# Patient Record
Sex: FEMALE | Race: WHITE | NOT HISPANIC OR LATINO | RURAL
[De-identification: names, ages, dates, MRNs, and addresses within clinical notes are randomized per-mention and may not be internally consistent; named-entity substitution may affect disease eponyms.]

---

## 2024-08-27 ENCOUNTER — HOSPITAL ENCOUNTER (EMERGENCY)
Facility: HOSPITAL | Age: 29
Discharge: HOME | End: 2024-08-27
Payer: COMMERCIAL

## 2024-08-27 VITALS
OXYGEN SATURATION: 98 % | HEART RATE: 98 BPM | SYSTOLIC BLOOD PRESSURE: 129 MMHG | TEMPERATURE: 98 F | RESPIRATION RATE: 19 BRPM | BODY MASS INDEX: 32.95 KG/M2 | WEIGHT: 205 LBS | HEIGHT: 66 IN | DIASTOLIC BLOOD PRESSURE: 93 MMHG
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
## 2024-08-27 DIAGNOSIS — S80.02XA CONTUSION OF LEFT KNEE, INITIAL ENCOUNTER: ICD-10-CM

## 2024-08-27 DIAGNOSIS — S80.01XA CONTUSION OF RIGHT KNEE, INITIAL ENCOUNTER: Primary | ICD-10-CM

## 2024-08-27 DIAGNOSIS — S96.911A SPRAIN AND STRAIN OF RIGHT ANKLE: ICD-10-CM

## 2024-08-27 DIAGNOSIS — S93.401A SPRAIN AND STRAIN OF RIGHT ANKLE: ICD-10-CM

## 2024-08-27 LAB
AMPHET UR QL SCN: NEGATIVE
BARBITURATES UR QL SCN: NEGATIVE
BENZODIAZ METAB UR QL SCN: NEGATIVE
COCAINE UR QL SCN: NEGATIVE
MDA UR QL SCN: NEGATIVE
METHADONE UR QL SCN: NEGATIVE
METHAMPHET UR QL SCN: NEGATIVE
OPIATES UR QL SCN: NEGATIVE
OXYCODONE UR QL SCN: NEGATIVE
PCP UR QL SCN: NEGATIVE
THC UR QL SCN: NEGATIVE
TRICYCLICS UR QL SCN: NEGATIVE

## 2024-08-27 PROCEDURE — 96372 THER/PROPH/DIAG INJ SC/IM: CPT | Performed by: NURSE PRACTITIONER

## 2024-08-27 PROCEDURE — 99284 EMERGENCY DEPT VISIT MOD MDM: CPT | Mod: 25

## 2024-08-27 PROCEDURE — 99284 EMERGENCY DEPT VISIT MOD MDM: CPT | Mod: ,,, | Performed by: NURSE PRACTITIONER

## 2024-08-27 PROCEDURE — 63600175 PHARM REV CODE 636 W HCPCS: Performed by: NURSE PRACTITIONER

## 2024-08-27 PROCEDURE — 80305 DRUG TEST PRSMV DIR OPT OBS: CPT | Performed by: NURSE PRACTITIONER

## 2024-08-27 RX ORDER — KETOROLAC TROMETHAMINE 30 MG/ML
15 INJECTION, SOLUTION INTRAMUSCULAR; INTRAVENOUS
Status: COMPLETED | OUTPATIENT
Start: 2024-08-27 | End: 2024-08-27

## 2024-08-27 RX ORDER — SERTRALINE HYDROCHLORIDE 100 MG/1
TABLET, FILM COATED ORAL
COMMUNITY
Start: 2024-05-31

## 2024-08-27 RX ORDER — CARIPRAZINE 3 MG/1
1 CAPSULE, GELATIN COATED ORAL
COMMUNITY
Start: 2024-08-05

## 2024-08-27 RX ADMIN — KETOROLAC TROMETHAMINE 15 MG: 30 INJECTION, SOLUTION INTRAMUSCULAR at 04:08

## 2024-08-27 NOTE — ED PROVIDER NOTES
Encounter Date: 8/27/2024       History     Chief Complaint   Patient presents with    Fall     28 yr old WF with c/o pain to bilateral knee, right ankle and right foot s/p fall.  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    The history is provided by the patient.   Fall  The accident occurred just prior to arrival. The fall occurred while running. She landed on Grass. The pain is present in the right knee and left knee. She was Ambulatory at the scene. There was No entrapment after the fall. There was No drug use involved in the accident. There was No alcohol use involved in the accident. Pertinent negatives include no neck pain, no back pain, no paresthesias, no paralysis, no abdominal pain and no loss of consciousness. The symptoms are aggravated by activity. She has tried nothing for the symptoms.     Review of patient's allergies indicates:  No Known Allergies  History reviewed. No pertinent past medical history.  History reviewed. No pertinent surgical history.  No family history on file.  Social History     Tobacco Use    Smoking status: Every Day     Types: Cigarettes, Vaping with nicotine    Smokeless tobacco: Never   Substance Use Topics    Alcohol use: Yes     Comment: social    Drug use: Never     Review of Systems   Constitutional: Negative.    Respiratory: Negative.     Cardiovascular: Negative.    Gastrointestinal:  Negative for abdominal pain.   Genitourinary: Negative.    Musculoskeletal:  Negative for back pain and neck pain.   Neurological:  Negative for loss of consciousness and paresthesias.       Physical Exam     Initial Vitals [08/27/24 1534]   BP Pulse Resp Temp SpO2   (!) 129/93 98 19 98.2 °F (36.8 °C) 98 %      MAP       --         Physical Exam    Constitutional: She appears well-developed and well-nourished.   Neck:   Normal range of motion.  Cardiovascular:  Normal rate and regular rhythm.           Pulmonary/Chest: Breath  sounds normal.   Musculoskeletal:      Cervical back: Normal range of motion.     Neurological: She is alert and oriented to person, place, and time.   Skin: Skin is warm and dry.   Psychiatric: She has a normal mood and affect.         Medical Screening Exam   See Full Note    ED Course   Procedures  Labs Reviewed   DRUG SCREEN, URINE (BEAKER)          Imaging Results              X-Ray Ankle Complete Right (Final result)  Result time 08/27/24 15:49:15      Final result by Salbador Vásquez II, MD (08/27/24 15:49:15)                   Impression:      No evidence of abnormality demonstrated      Electronically signed by: Salbador Vásquez  Date:    08/27/2024  Time:    15:49               Narrative:    EXAMINATION:  XR ANKLE COMPLETE 3 VIEW RIGHT; XR FOOT COMPLETE 3 VIEW RIGHT    CLINICAL HISTORY:  Unspecified fall, initial encounter    COMPARISON:  None available    TECHNIQUE:  XR ANKLE 3 VIEW RIGHT; XR FOOT 3 VIEW RIGHT    FINDINGS:  No evidence of fracture seen.  The alignment of the joints appears normal.  No degenerative change is present.  No soft tissue abnormality is seen.                                       X-Ray Foot Complete Right (Final result)  Result time 08/27/24 15:49:15      Final result by Salbador Vásquez II, MD (08/27/24 15:49:15)                   Impression:      No evidence of abnormality demonstrated      Electronically signed by: Salbador Vásquez  Date:    08/27/2024  Time:    15:49               Narrative:    EXAMINATION:  XR ANKLE COMPLETE 3 VIEW RIGHT; XR FOOT COMPLETE 3 VIEW RIGHT    CLINICAL HISTORY:  Unspecified fall, initial encounter    COMPARISON:  None available    TECHNIQUE:  XR ANKLE 3 VIEW RIGHT; XR FOOT 3 VIEW RIGHT    FINDINGS:  No evidence of fracture seen.  The alignment of the joints appears normal.  No degenerative change is present.  No soft tissue abnormality is seen.                                       X-Ray Knee 1 or 2 View Bilateral (Final result)  Result time  08/27/24 15:49:59   Procedure changed from X-Ray Knee 3 View Left     Final result by Salbador Vásquez II, MD (08/27/24 15:49:59)                   Impression:      No evidence of abnormality demonstrated      Electronically signed by: Salbador Vásquez  Date:    08/27/2024  Time:    15:49               Narrative:    EXAMINATION:  XR KNEE 1 OR 2 VIEW BILATERAL    CLINICAL HISTORY:  fall; Unspecified fall, initial encounter    COMPARISON:  4 August 2020    TECHNIQUE:  XR KNEE 2 VIEW BILATERAL    FINDINGS:  No evidence of fracture seen.  The alignment of the joints appears normal.  No degenerative change is present.  No soft tissue abnormality is seen.                                       Medications   ketorolac injection 15 mg (15 mg Intramuscular Given 8/27/24 1618)     Medical Decision Making  28 yr old WF with c/o pain to bilateral knee, right ankle and right foot s/p fall.  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      Amount and/or Complexity of Data Reviewed  Radiology: ordered.     Details: No acute findings      Risk  Prescription drug management.               ED Course as of 08/27/24 1620   Tue Aug 27, 2024   1619 Pt reports her employer request drug screen, explained that ours is not for work comp that there is no chain of custody.  Pt spoke with employer and she states they said to go ahead and do so here and if she need to repeat elsewhere tomorrow that she can do that. [CG]      ED Course User Index  [CG] Lindsay Good, BORISP                           Clinical Impression:   Final diagnoses:  [W19.XXXA] Fall  [S80.01XA] Contusion of right knee, initial encounter (Primary)  [S80.02XA] Contusion of left knee, initial encounter  [S93.401A, S96.911A] Sprain and strain of right ankle        ED Disposition Condition    Discharge Stable          ED Prescriptions    None       Follow-up Information       Follow up With Specialties Details Why  Contact Info    Kandy Bojorquez, [facility]   [address] [facility] [address]  [phone]               Lindsay Good, Health system  08/27/24 1617       Lindsay Good, Health system  08/27/24 1621

## 2024-08-27 NOTE — ED TRIAGE NOTES
"To ER c/o "chasing a child at school, the child stopped but I did not. I twisted my right knee then fell on my left knee." C/O pain to right knee, right ankle, and across top of right foot, also reports some pain to left knee. Fall occurred in grass, denies hitting her head. Family member at bedside. Ice pack placed on areas.   "

## 2024-08-27 NOTE — DISCHARGE INSTRUCTIONS
Apply ice pack every 2-3 hours for 15 minutes.  Take tylenol or ibuprofen as directed if needed for pain.  Wear ace and use crutches for comfort.  Follow up with your primary care provider or [facility].  Return for worsening condition or emergency needs.